# Patient Record
Sex: FEMALE | Race: BLACK OR AFRICAN AMERICAN | Employment: UNEMPLOYED | URBAN - METROPOLITAN AREA
[De-identification: names, ages, dates, MRNs, and addresses within clinical notes are randomized per-mention and may not be internally consistent; named-entity substitution may affect disease eponyms.]

---

## 2017-01-01 ENCOUNTER — ED HISTORICAL/CONVERTED ENCOUNTER (OUTPATIENT)
Dept: OTHER | Age: 57
End: 2017-01-01

## 2017-01-01 ENCOUNTER — OP HISTORICAL/CONVERTED ENCOUNTER (OUTPATIENT)
Dept: OTHER | Age: 57
End: 2017-01-01

## 2017-01-01 ENCOUNTER — OFFICE VISIT (OUTPATIENT)
Dept: ENDOCRINOLOGY | Age: 57
End: 2017-01-01

## 2017-01-01 VITALS
HEART RATE: 74 BPM | WEIGHT: 152.3 LBS | RESPIRATION RATE: 16 BRPM | SYSTOLIC BLOOD PRESSURE: 146 MMHG | HEIGHT: 67 IN | DIASTOLIC BLOOD PRESSURE: 90 MMHG | OXYGEN SATURATION: 100 % | TEMPERATURE: 97.1 F | BODY MASS INDEX: 23.9 KG/M2

## 2017-01-01 DIAGNOSIS — G62.9 NEUROPATHY: ICD-10-CM

## 2017-01-01 DIAGNOSIS — K74.60 CIRRHOSIS OF LIVER WITHOUT ASCITES, UNSPECIFIED HEPATIC CIRRHOSIS TYPE (HCC): ICD-10-CM

## 2017-01-01 DIAGNOSIS — Z79.4 TYPE 2 DIABETES MELLITUS WITH HYPERGLYCEMIA, WITH LONG-TERM CURRENT USE OF INSULIN (HCC): ICD-10-CM

## 2017-01-01 DIAGNOSIS — E11.65 TYPE 2 DIABETES MELLITUS WITH HYPERGLYCEMIA, WITH LONG-TERM CURRENT USE OF INSULIN (HCC): Primary | ICD-10-CM

## 2017-01-01 DIAGNOSIS — Z79.4 TYPE 2 DIABETES MELLITUS WITH HYPERGLYCEMIA, WITH LONG-TERM CURRENT USE OF INSULIN (HCC): Primary | ICD-10-CM

## 2017-01-01 DIAGNOSIS — E11.65 TYPE 2 DIABETES MELLITUS WITH HYPERGLYCEMIA, WITH LONG-TERM CURRENT USE OF INSULIN (HCC): ICD-10-CM

## 2017-01-01 LAB
GLUCOSE POC: 208 MG/DL
HBA1C MFR BLD HPLC: 5.5 %

## 2017-01-01 RX ORDER — NATEGLINIDE 60 MG/1
60 TABLET ORAL
Qty: 90 TAB | Refills: 6 | Status: SHIPPED | OUTPATIENT
Start: 2017-01-01 | End: 2018-01-01 | Stop reason: ALTCHOICE

## 2017-01-01 RX ORDER — LANCING DEVICE
EACH MISCELLANEOUS
Qty: 1 EACH | Refills: 0 | Status: SHIPPED | OUTPATIENT
Start: 2017-01-01

## 2017-01-27 ENCOUNTER — OP HISTORICAL/CONVERTED ENCOUNTER (OUTPATIENT)
Dept: OTHER | Age: 57
End: 2017-01-27

## 2017-03-24 ENCOUNTER — IP HISTORICAL/CONVERTED ENCOUNTER (OUTPATIENT)
Dept: OTHER | Age: 57
End: 2017-03-24

## 2017-06-26 ENCOUNTER — OFFICE VISIT (OUTPATIENT)
Dept: ENDOCRINOLOGY | Age: 57
End: 2017-06-26

## 2017-06-26 VITALS
TEMPERATURE: 98.1 F | HEART RATE: 87 BPM | DIASTOLIC BLOOD PRESSURE: 70 MMHG | WEIGHT: 166 LBS | HEIGHT: 67 IN | SYSTOLIC BLOOD PRESSURE: 106 MMHG | OXYGEN SATURATION: 100 % | BODY MASS INDEX: 26.06 KG/M2 | RESPIRATION RATE: 20 BRPM

## 2017-06-26 DIAGNOSIS — E11.65 TYPE 2 DIABETES MELLITUS WITH HYPERGLYCEMIA, WITH LONG-TERM CURRENT USE OF INSULIN (HCC): Primary | ICD-10-CM

## 2017-06-26 DIAGNOSIS — Z79.4 TYPE 2 DIABETES MELLITUS WITH HYPERGLYCEMIA, WITH LONG-TERM CURRENT USE OF INSULIN (HCC): Primary | ICD-10-CM

## 2017-06-26 DIAGNOSIS — K74.60 CIRRHOSIS OF LIVER WITHOUT ASCITES, UNSPECIFIED HEPATIC CIRRHOSIS TYPE (HCC): ICD-10-CM

## 2017-06-26 DIAGNOSIS — G62.9 NEUROPATHY: ICD-10-CM

## 2017-06-26 LAB — HBA1C MFR BLD HPLC: 7.2 %

## 2017-06-26 RX ORDER — TIZANIDINE 4 MG/1
1 TABLET ORAL
Refills: 0 | COMMUNITY
Start: 2017-05-25

## 2017-06-26 RX ORDER — EMOLLIENT BASE
CREAM (GRAM) TOPICAL
COMMUNITY
Start: 2017-05-25

## 2017-06-26 RX ORDER — MORPHINE SULFATE 15 MG/1
TABLET ORAL
Refills: 0 | COMMUNITY
Start: 2017-05-27

## 2017-06-26 RX ORDER — ALPRAZOLAM 0.25 MG/1
TABLET ORAL
COMMUNITY
Start: 2017-05-25

## 2017-06-26 RX ORDER — LOSARTAN POTASSIUM 25 MG/1
TABLET ORAL
COMMUNITY
Start: 2017-03-31

## 2017-06-26 RX ORDER — IPRATROPIUM BROMIDE AND ALBUTEROL SULFATE 2.5; .5 MG/3ML; MG/3ML
SOLUTION RESPIRATORY (INHALATION)
Refills: 0 | COMMUNITY
Start: 2017-03-31

## 2017-06-26 RX ORDER — INSULIN GLARGINE 100 [IU]/ML
INJECTION, SOLUTION SUBCUTANEOUS
Qty: 30 ML | Refills: 4 | Status: SHIPPED | OUTPATIENT
Start: 2017-06-26 | End: 2018-01-01 | Stop reason: SDUPTHER

## 2017-06-26 RX ORDER — NATEGLINIDE 60 MG/1
60 TABLET ORAL
Qty: 90 TAB | Refills: 6 | Status: SHIPPED | OUTPATIENT
Start: 2017-06-26 | End: 2017-01-01 | Stop reason: SDUPTHER

## 2017-06-26 NOTE — PROGRESS NOTES
Wt Readings from Last 3 Encounters:   06/26/17 166 lb (75.3 kg)   11/10/16 174 lb 12.8 oz (79.3 kg)   08/03/16 177 lb (80.3 kg)     Temp Readings from Last 3 Encounters:   06/26/17 98.1 °F (36.7 °C) (Oral)   11/10/16 97.6 °F (36.4 °C) (Oral)   08/03/16 97 °F (36.1 °C) (Oral)     BP Readings from Last 3 Encounters:   06/26/17 106/70   11/10/16 (!) 134/95   08/03/16 139/77     Pulse Readings from Last 3 Encounters:   06/26/17 87   11/10/16 (!) 200   08/03/16 87     Lab Results   Component Value Date/Time    Hemoglobin A1c 9.3 06/30/2016 12:11 PM    Hemoglobin A1c, External 6.5 11/09/2016     No Podiatry  Last Eye exam 2017

## 2017-06-26 NOTE — PROGRESS NOTES
Order placed for pt,  per Verbal Order with read back from Dr Barb Ross on 6/26/2017. For information on Fall & Injury Prevention, visit [email or web address]

## 2017-06-26 NOTE — PROGRESS NOTES
HISTORY OF PRESENT ILLNESS  Magdaleno Matthews is a 65 y.o. female. HPI  Patient here for third   f/u after  initial visit of Type 2 diabetes mellitus  From Nov 2016 . Lost 8  lbs     She has done good with sugars and followed all instructions     Sugars look great   Tolerating Lantus  Well along with starlix           Old history :    Referred : by pcp     H/o diabetes for 1 month     She is accompanied by her daughter     She got diagnsed a few months ago , but not gotten treatment   [facility] - when her random blood sugars were over 400 mg     She got onto Metformin at [facility] 2 weeks ago June 16 2016     Current A1C is 7 %    From may 2016  and symptoms/problems include none, polyuria, polydipsia, visual disturbances and fatigue     Current diabetic medications include diet. Current monitoring regimen: home blood tests - daily  Home blood sugar records: trend: fluctuating a lot  Any episodes of hypoglycemia? no    Weight trend: decreasing rapidly  Prior visit with dietician: no  Current diet: \"unhealthy\" diet in general  Current exercise: no regular exercise    Known diabetic complications:   Cardiovascular risk factors: none    Eye exam current (within one year): no  RUDDY: no     Past Medical History:   Diagnosis Date    DM [facility]diabetes mellitus) ([facility] [address]     HTN (hypertension)     Liver cirrhosis (HCC)      Past Surgical History:   Procedure Laterality Date    HX HIP REPLACEMENT Left     HX WRIST FRACTURE TX       Current Outpatient Prescriptions   Medication Sig    morphine IR (MS IR) 15 mg tablet take 1 tablet by mouth once daily as directed for 30 DAYS    tiZANidine (ZANAFLEX) 4 mg tablet Take 1 Tab by mouth nightly.     ALPRAZolam (XANAX) 0.25 mg tablet     VANICREAM topical cream     losartan (COZAAR) 25 mg tablet     albuterol-ipratropium (DUO-NEB) 2.5 mg-0.5 mg/3 ml nebu inhale contents of 1 vial in nebulizer every 6 hours while awake    PROCTOZONE-HC 2.5 % rectal cream  BD ULTRA-FINE II LANCETS 30 gauge misc     ibuprofen (MOTRIN) 600 mg tablet     LYRICA 50 mg capsule Take 1 Cap by mouth three (3) times daily.  metFORMIN (GLUCOPHAGE) 500 mg tablet Take  by mouth two (2) times daily (with meals).  Cetirizine 10 mg cap Take  by mouth.  folic acid (FOLVITE) 1 mg tablet Take  by mouth daily.  furosemide (LASIX) 40 mg tablet Take 40 mg by mouth daily.  omeprazole (PRILOSEC) 40 mg capsule Take 40 mg by mouth daily.  spironolactone (ALDACTONE) 50 mg tablet Take 100 mg by mouth two (2) times a day.  meclizine (ANTIVERT) 25 mg tablet Take  by mouth three (3) times daily as needed.  insulin glargine (LANTUS SOLOSTAR) 100 unit/mL (3 mL) pen Inject 24 units at bedtime    Insulin Needles, Disposable, (BD INSULIN PEN NEEDLE UF SHORT) 31 gauge x 5/16\" ndle Use once daily. Dx code E11.65    glucose blood VI test strips (FREESTYLE LITE STRIPS) strip Test 4 times daily. Dx code E11.65     No current facility-administered medications for this visit. Review of Systems   Constitutional: Negative. HENT: Negative. Eyes: Negative for pain and redness. Respiratory: Negative. Cardiovascular: Negative for chest pain, palpitations and leg swelling. Gastrointestinal: Negative. Negative for constipation. Genitourinary: Negative. Musculoskeletal: Negative for myalgias. Skin: Negative. Neurological: tingling on big toes. Endo/Heme/Allergies: Negative. Psychiatric/Behavioral: Negative for depression and memory loss. The patient does not have insomnia. Physical Exam   Constitutional: She is oriented to person, place, and time. She appears well-developed and well-nourished. HENT:   Head: Normocephalic. Eyes: Conjunctivae and EOM are normal. Pupils are equal, round, and reactive to light. Neck: Normal range of motion. Neck supple. No JVD present. No tracheal deviation present. No thyromegaly present.    Cardiovascular: Tachycardic  No murmur heard. Pulmonary/Chest: Breath sounds normal.   Abdominal: Soft. Bowel sounds are normal.   Musculoskeletal: Normal range of motion. Lymphadenopathy:     She has no cervical adenopathy. Neurological: She is alert and oriented to person, place, and time. She has normal reflexes. Skin: Skin is warm. Psychiatric: She has a normal mood and affect. Bilateral bunions on feet       NO NEW LABS   Last labs were done in Nov 2016       ASSESSMENT and PLAN  1. Type 2 DM uncontrolled :   a1c is   7.2 %     From   June 2017     compared to  6.5 %      From   Nov 3 2016  Compared to   9.3 %    From June 30 2016 compared to MAy 2016 - a1c is 7 %     She has done good , reassured her   Reviewed the glucose log : fasting  and Pre-prandial - at 100 mg    Tolerating it very well on  insulin , basal   And starlix ( skips starlix at lunches )    Discussed the insulin regimen, insulin actions and precautions   As she has liver compromise, will have to start low doses to prevent the hypoglycemias       Patient is advised to check blood sugars 3-4  times daily by rotation method. reviewed medications and side effects in detail  lab results and schedule of future lab studies reviewed with patient        2. Hypoglycemia :  Educated on treating the hypoglycemia. 3. HTN : only on spironolactone . She has cirrhosis , unsure of her child purgh classification    4. Dyslipidemia : not on meds. Has cirrhosis     5. use of aspirin to prevent MI and TIA's discussed      6. H/o cirrhosis,  Alcoholic and she had stopped it but occasionally she consumes still   She is on spironolactone and need info from her GI Dr. Zeb Plunkett      7.  Neuropathy : on lyrica tid         > 50 % of time is spent on counseling

## 2017-06-26 NOTE — PATIENT INSTRUCTIONS
Do not skip meals  Do not eat in between meals    Reduce carbs- pasta, rice, potatoes, bread   Do not drink juices or sodas  Donot eat peanut butter     Do not eat sugar free cookies and cakes   Do not eat peaches, grapes , pineapples and oranges       Lyrica 3 times a day         Check blood sugars immediately before each meal and at bedtime      Take  Lantus  insulin  24 units  at bed time    Stay on starlix 60 mg right before meals ( she says she never got this)   Less than 70 mg NO INSULIN

## 2017-06-26 NOTE — MR AVS SNAPSHOT
Visit Information     Date & Time Provider Department Dept. Phone Encounter #    6/26/2017  3:30 PM Geneva Bangura MD [facility] [place] [facility] [phone] [id number]      Follow-up Instructions     Return in about 6 months (around 12/26/2017). Upcoming Health Maintenance        Date Due    Hepatitis C Screening 1960    FOOT EXAM Q1 1/15/1971    EYE EXAM RETINAL OR DILATED Q1 1/15/1971    Pneumococcal 19-64 Medium Risk (1 of 1 - PPSV23) 1/15/1980    DTaP/Tdap/Td series (1 - Tdap) 1/15/1982    BREAST CANCER SCRN MAMMOGRAM 1/15/2011    FOBT Q 1 YEAR AGE 50-75 1/15/2011    HEMOGLOBIN A1C Q6M 5/9/2017    INFLUENZA AGE 9 TO ADULT 8/1/2017    MICROALBUMIN Q1 11/9/2017    LIPID PANEL Q1 11/9/2017    PAP AKA CERVICAL CYTOLOGY 4/27/2019      Allergies as of 6/26/2017  Review Complete On: 6/26/2017 By: Geneva Bangura MD       Severity Noted Reaction Type Reactions    Hydrocodone  08/03/2016    Itching, Other (comments)    Sweating. Current Immunizations  Never Reviewed    No immunizations on file. Not reviewed this visit   You Were Diagnosed With        Codes Comments    Type 2 diabetes mellitus with hyperglycemia, with long-term current use of insulin [facility]    -  Primary ICD-10-CM: E11.65, Z79.4  ICD-9-CM: 250.00, 790.29, V58.67     Cirrhosis of liver without ascites, unspecified hepatic cirrhosis type ([facility] [address]     ICD-10-CM: K74.60  ICD-9-CM: 571.5     Neuropathy     ICD-10-CM: G62.9  ICD-9-CM: 355. 9       Vitals     BP Pulse Temp Resp Height(growth percentile) Weight(growth percentile)    106/70 87 98.1 °F (36.7 °C) (Oral) 20 5' 7\" (1.702 m) 166 lb (75.3 kg)    SpO2 BMI OB Status Smoking Status          100% 26 kg/m2 Postmenopausal Current Every Day Smoker        BMI and BSA Data     Body Mass Index Body Surface Area    26 kg/m 2 1.89 m 2         Preferred Pharmacy       Pharmacy Name Phone    [facility] [address] [phone]         Your Updated Medication List This list is accurate as of: 6/26/17  4:25 PM.  Always use your most recent med list.                albuterol-ipratropium 2.5 mg-0.5 mg/3 ml Nebu   Commonly known as:  DUO-NEB   inhale contents of 1 vial in nebulizer every 6 hours while awake       ALPRAZolam 0.25 mg tablet   Commonly known as:  XANAX       BD ULTRA-FINE II LANCETS 30 gauge Misc   Generic drug:  lancets       Cetirizine 10 mg Cap   Take  by mouth. folic acid 1 mg tablet   Commonly known as:  FOLVITE   Take  by mouth daily. furosemide 40 mg tablet   Commonly known as:  LASIX   Take 40 mg by mouth daily. glucose blood VI test strips strip   Commonly known as:  FREESTYLE LITE STRIPS   Test 4 times daily. Dx code E11.65       ibuprofen 600 mg tablet   Commonly known as:  MOTRIN       insulin glargine 100 unit/mL (3 mL) Inpn   Commonly known as:  LOGANBASFRIDA   Inject 24 units at bedtime       Insulin Needles (Disposable) 31 gauge x 5/16\" Ndle   Commonly known as:  BD INSULIN PEN NEEDLE UF SHORT   Use once daily. Dx code E11.65       losartan 25 mg tablet   Commonly known as:  COZAAR       LYRICA 50 mg capsule   Generic drug:  pregabalin   Take 1 Cap by mouth three (3) times daily. meclizine 25 mg tablet   Commonly known as:  ANTIVERT   Take  by mouth three (3) times daily as needed. metFORMIN 500 mg tablet   Commonly known as:  GLUCOPHAGE   Take  by mouth two (2) times daily (with meals). morphine IR 15 mg tablet   Commonly known as:  MS IR   take 1 tablet by mouth once daily as directed for 30 DAYS       omeprazole 40 mg capsule   Commonly known as:  PRILOSEC   Take 40 mg by mouth daily. PROCTOZONE-HC 2.5 % rectal cream   Generic drug:  hydrocortisone       spironolactone 50 mg tablet   Commonly known as:  ALDACTONE   Take 100 mg by mouth two (2) times a day. tiZANidine 4 mg tablet   Commonly known as:  ZANAFLEX   Take 1 Tab by mouth nightly.        VANICREAM topical cream   Generic drug:  emollient Prescriptions Sent to Pharmacy        Refills    insulin glargine (LANTUS,BASAGLAR) 100 unit/mL (3 mL) inpn 4    Sig: Inject 24 units at bedtime    Class: Normal    Pharmacy: [facility] [address]  #: [phone]      We Performed the Following     AMB POC HEMOGLOBIN A1C [47726 CPT(R)]       Follow-up Instructions     Return in about 6 months (around 12/26/2017). Patient Instructions      Do not skip meals  Do not eat in between meals    Reduce carbs- pasta, rice, potatoes, bread   Do not drink juices or sodas  Donot eat peanut butter     Do not eat sugar free cookies and cakes   Do not eat peaches, grapes , pineapples and oranges       Lyrica 3 times a day         Check blood sugars immediately before each meal and at bedtime      Take  Lantus  insulin  24 units  at bed time    Stay on starlix 60 mg right before meals      Less than 70 mg NO INSULIN             Introducing [id number]! Tanis Epley introduces [employer] patient portal. Now you can access parts of your medical record, email your doctor's office, and request medication refills online. 1. In your internet browser, go to [email or web address]   2. Click on the First Time User? Click Here link in the Sign In box. You will see the New Member Sign Up page. 3. Enter your [employer] Access Code exactly as it appears below. You will not need to use this code after youve completed the sign-up process. If you do not sign up before the expiration date, you must request a new code. · [employer] Access Code: [email or web address]  Expires: 9/24/2017  4:25 PM    4. Enter the last four digits of your Social Security Number (xxxx) and Date of Birth (mm/dd/yyyy) as indicated and click Submit. You will be taken to the next sign-up page. 5. Create a TopLogt ID. This will be your [employer] login ID and cannot be changed, so think of one that is secure and easy to remember. 6. Create a N[employer]ix password.  You can change your password at any time. 7. Enter your Password Reset Question and Answer. This can be used at a later time if you forget your password. 8. Enter your e-mail address. You will receive e-mail notification when new information is available in [address] 9. Click Sign Up. You can now view and download portions of your medical record. 10. Click the Download Summary menu link to download a portable copy of your medical information. If you have questions, please visit the Frequently Asked Questions section of the [employer] website. Remember, [employer] is NOT to be used for urgent needs. For medical emergencies, dial 911. Now available from your iPhone and Android! Please provide this summary of care documentation to your next provider. Your primary care clinician is listed as Ethan Smith. If you have any questions after today's visit, please call [phone].

## 2017-12-15 NOTE — PROGRESS NOTES
HISTORY OF PRESENT ILLNESS  Junaid Vaughn is a 65 y.o. female. HPI  Patient here for   f/u after last visit of Type 2 diabetes mellitus  From June 2017  . Lost 14  lbs     She has done good with diet   She is happy that she is avoiding having to take med with each  Meal      Sugars look great   Tolerating Lantus            Old history :    Referred : by pcp     H/o diabetes for 1 month     She is accompanied by her daughter     She got diagnsed a few months ago , but not gotten treatment   She was [facility] to [facility] - when her random blood sugars were over 400 mg     She got onto Metformin at [facility] 2 weeks ago June 16 2016     Current A1C is 7 %    From may 2016  and symptoms/problems include none, polyuria, polydipsia, visual disturbances and fatigue     Current diabetic medications include diet. Current monitoring regimen: home blood tests - daily  Home blood sugar records: trend: fluctuating a lot  Any episodes of hypoglycemia? no    Weight trend: decreasing rapidly  Prior visit with dietician: no  Current diet: \"unhealthy\" diet in general  Current exercise: no regular exercise    Known diabetic complications:   Cardiovascular risk factors: none    Eye exam current (within one year): no  RUDDY: no     Past Medical History:   Diagnosis Date    DM [facility]di[facility] mellitus) ([facility] [address]     HTN (hypertension)     Liver cirrhosis (HCC)      Past Surgical History:   Procedure Laterality Date    HX HIP REPLACEMENT Left     HX WRIST FRACTURE TX       Current Outpatient Prescriptions   Medication Sig    tiZANidine (ZANAFLEX) 4 mg tablet Take 1 Tab by mouth nightly.     ALPRAZolam (XANAX) 0.25 mg tablet     VANICREAM topical cream     albuterol-ipratropium (DUO-NEB) 2.5 mg-0.5 mg/3 ml nebu inhale contents of 1 vial in nebulizer every 6 hours while awake    insulin glargine (LANTUS,BASAGLAR) 100 unit/mL (3 mL) inpn Inject 24 units at bedtime    PROCTOZONE-HC 2.5 % rectal cream     BD ULTRA-FINE II LANCETS 30 gauge misc     ibuprofen (MOTRIN) 600 mg tablet 800 mg.  LYRICA 50 mg capsule Take 1 Cap by mouth three (3) times daily.  metFORMIN (GLUCOPHAGE) 500 mg tablet Take  by mouth two (2) times daily (with meals).  Cetirizine 10 mg cap Take  by mouth.  folic acid (FOLVITE) 1 mg tablet Take  by mouth daily.  furosemide (LASIX) 40 mg tablet Take 40 mg by mouth daily.  omeprazole (PRILOSEC) 40 mg capsule Take 40 mg by mouth daily.  spironolactone (ALDACTONE) 50 mg tablet Take 100 mg by mouth two (2) times a day.  Insulin Needles, Disposable, (BD INSULIN PEN NEEDLE UF SHORT) 31 gauge x 5/16\" ndle Use once daily. Dx code E11.65    glucose blood VI test strips (FREESTYLE LITE STRIPS) strip Test 4 times daily. Dx code E11.65    morphine IR (MS IR) 15 mg tablet take 1 tablet by mouth once daily as directed for 30 DAYS    losartan (COZAAR) 25 mg tablet     nateglinide (STARLIX) 60 mg tablet Take 1 Tab by mouth Before breakfast, lunch, and dinner.  meclizine (ANTIVERT) 25 mg tablet Take  by mouth three (3) times daily as needed. No current facility-administered medications for this visit. Review of Systems   Constitutional: lost weight . HENT: Negative. Eyes: Negative for pain and redness. Respiratory: Negative. Cardiovascular: Negative for chest pain, palpitations and leg swelling. Gastrointestinal: Negative. Negative for constipation. Genitourinary: Negative. Musculoskeletal: Negative for myalgias. Skin: Negative. Neurological: tingling on big toes. Endo/Heme/Allergies: Negative. Psychiatric/Behavioral: Negative for depression and memory loss. The patient does not have insomnia. Physical Exam   Constitutional: She is oriented to person, place, and time. She appears well-developed and well-nourished. HENT:   Head: Normocephalic. Eyes: Conjunctivae and EOM are normal. Pupils are equal, round, and reactive to light. Neck: Normal range of motion. Neck supple. No JVD present. No tracheal deviation present. No thyromegaly present. Cardiovascular: Tachycardic  No murmur heard. Pulmonary/Chest: Breath sounds normal.   Abdominal: Soft. Bowel sounds are normal.   Musculoskeletal: Normal range of motion. Lymphadenopathy:     She has no cervical adenopathy. Neurological: She is alert and oriented to person, place, and time. She has normal reflexes. Skin: Skin is warm. Psychiatric: She has a normal mood and affect. Bilateral bunions on feet         Labs from June 2017 scanned to [facility] and PLAN  1. Type 2 DM uncontrolled :   a1c is  5.5 %     From    Dec 2017   Compared to   7.2 %     From   June 2017     compared to  6.5 %      From   Nov 3 2016  Compared to   9.3 %    From June 30 2016 compared to MAy 2016 - a1c is 7 %     She has done good , she Dislikes low sugars and she is working to EAT right   She has few sugars - which are high   Reviewed the glucose log : fasting  and Pre-prandial - at 100 mg    Tolerating it very well on  insulin , basal   And starlix ( skips starlix at lunches )    Discussed the insulin regimen, insulin actions and precautions   As she has liver compromise, will have to start low doses to prevent the hypoglycemias       Patient is advised to check blood sugars 3-4  times daily by rotation method. reviewed medications and side effects in detail  lab results and schedule of future lab studies reviewed with patient        2. Hypoglycemia :  Educated on treating the hypoglycemia. 3. HTN : only on spironolactone . She has cirrhosis , unsure of her child purgh classification    4. Dyslipidemia : not on meds. Has cirrhosis     5. use of aspirin to prevent MI and TIA's discussed      6. H/o cirrhosis,  Alcoholic and she had stopped it but occasionally she consumes still   She is on spironolactone and need info from her GI Dr. Marah Andrade      7.  Neuropathy : on lyrica tid         > 50 % of time is spent on counseling   Patient voiced understanding her plan of care

## 2017-12-15 NOTE — PROGRESS NOTES
Chief Complaint   Patient presents with    Diabetes     1. Have you been to the ER, urgent care clinic since your last visit? Hospitalized since your last visit? No    2. Have you seen or consulted any other health care providers outside of the [address] [place] [address] since your last visit? Include any pap smears or colon screening.  No     Wt Readings from Last 3 Encounters:   12/15/17 152 lb 4.8 oz (69.1 kg)   06/26/17 166 lb (75.3 kg)   11/10/16 174 lb 12.8 oz (79.3 kg)     Temp Readings from Last 3 Encounters:   12/15/17 97.1 °F (36.2 °C) (Oral)   06/26/17 98.1 °F (36.7 °C) (Oral)   11/10/16 97.6 °F (36.4 °C) (Oral)     BP Readings from Last 3 Encounters:   12/15/17 146/90   06/26/17 106/70   11/10/16 (!) 134/95     Pulse Readings from Last 3 Encounters:   12/15/17 74   06/26/17 87   11/10/16 (!) 200     Pt has meter  Pt had a foot and eye exam

## 2017-12-15 NOTE — MR AVS SNAPSHOT
Visit Information     Date & Time Provider Department Dept. Phone Encounter #    12/15/2017 11:45 AM Sarath Hough MD [facility] Diabetes & Endocrinology [phone] [id number]      Follow-up Instructions     Return in about 6 months (around 6/15/2018). Upcoming Health Maintenance        Date Due    Hepatitis C Screening 1960    EYE EXAM RETINAL OR DILATED Q1 1/15/1971    Pneumococcal 19-64 Medium Risk (1 of 1 - PPSV23) 1/15/1980    DTaP/Tdap/Td series (1 - Tdap) 1/15/1982    FOBT Q 1 YEAR AGE 50-75 1/15/2011    Influenza Age 5 to Adult 8/1/2017    HEMOGLOBIN A1C Q6M 1/25/2018    MICROALBUMIN Q1 7/25/2018    LIPID PANEL Q1 7/25/2018    FOOT EXAM Q1 10/19/2018    PAP AKA CERVICAL CYTOLOGY 4/27/2019      Allergies as of 12/15/2017  Review Complete On: 12/15/2017 By: Sarath Hough MD       Severity Noted Reaction Type Reactions    Hydrocodone  08/03/2016    Itching, Other (comments)    Sweating. Current Immunizations  Never Reviewed    No immunizations on file.        Not reviewed this visit   You Were Diagnosed With        Codes Comments    Type 2 diabetes mellitus with hyperglycemia, with long-term current use of insulin ([facility]    -  Primary ICD-10-CM: E11.65, Z79.4  ICD-9-CM: 250.00, 790.29, V58.67       Vitals     BP Pulse Temp Resp Height(growth percentile) Weight(growth percentile)    146/90 (BP 1 Location: Right arm, BP Patient Position: Sitting) 74 97.1 °F (36.2 °C) (Oral) 16 5' 7\" (1.702 m) 152 lb 4.8 oz (69.1 kg)    SpO2 BMI OB Status Smoking Status          100% 23.85 kg/m2 Postmenopausal Current Every Day Smoker        BMI and BSA Data     Body Mass Index Body Surface Area    23.85 kg/m 2 1.81 m 2         Preferred Pharmacy       Pharmacy Name Phone    [facility] [address] [phone]         Your Updated Medication List          This list is accurate as of: 12/15/17 12:28 PM.  Always use your most recent med list. albuterol-ipratropium 2.5 mg-0.5 mg/3 ml Nebu   Commonly known as:  DUO-NEB   inhale contents of 1 vial in nebulizer every 6 hours while awake       ALPRAZolam 0.25 mg tablet   Commonly known as:  XANAX       BD ULTRA-FINE II LANCETS 30 gauge Misc   Generic drug:  lancets       Cetirizine 10 mg Cap   Take  by mouth. folic acid 1 mg tablet   Commonly known as:  FOLVITE   Take  by mouth daily. furosemide 40 mg tablet   Commonly known as:  LASIX   Take 40 mg by mouth daily. glucose blood VI test strips strip   Commonly known as:  FREESTYLE LITE STRIPS   Test 4 times daily. Dx code E11.65       ibuprofen 600 mg tablet   Commonly known as:  MOTRIN   800 mg.       insulin glargine 100 unit/mL (3 mL) Inpn   Commonly known as:  SILVIANO FORD   Inject 24 units at bedtime       Insulin Needles (Disposable) 31 gauge x 5/16\" Ndle   Commonly known as:  BD INSULIN PEN NEEDLE UF SHORT   Use once daily. Dx code E11.65       losartan 25 mg tablet   Commonly known as:  COZAAR       LYRICA 50 mg capsule   Generic drug:  pregabalin   Take 1 Cap by mouth three (3) times daily. meclizine 25 mg tablet   Commonly known as:  ANTIVERT   Take  by mouth three (3) times daily as needed. metFORMIN 500 mg tablet   Commonly known as:  GLUCOPHAGE   Take  by mouth two (2) times daily (with meals). morphine IR 15 mg tablet   Commonly known as:  MS IR   take 1 tablet by mouth once daily as directed for 30 DAYS       nateglinide 60 mg tablet   Commonly known as:  STARLIX   Take 1 Tab by mouth Before breakfast, lunch, and dinner. omeprazole 40 mg capsule   Commonly known as:  PRILOSEC   Take 40 mg by mouth daily. PROCTOZONE-HC 2.5 % rectal cream   Generic drug:  hydrocortisone       spironolactone 50 mg tablet   Commonly known as:  ALDACTONE   Take 100 mg by mouth two (2) times a day. tiZANidine 4 mg tablet   Commonly known as:  ZANAFLEX   Take 1 Tab by mouth nightly.        VANICREAM topical cream Generic drug:  emollient               We Performed the Following     AMB POC GLUCOSE BLOOD, BY GLUCOSE MONITORING DEVICE [90497 CPT(R)]     AMB POC HEMOGLOBIN A1C [49109 CPT(R)]       Follow-up Instructions     Return in about 6 months (around 6/15/2018). Patient Instructions      Do not skip meals  Do not eat in between meals    Reduce carbs- pasta, rice, potatoes, bread   Do not drink juices or sodas  Donot eat peanut butter     Do not eat sugar free cookies and cakes   Do not eat peaches, grapes , pineapples and oranges       Lyrica 3 times a day     Stay on metformin 500 mg twice a day     Check blood sugars immediately before each meal and at bedtime      Take  Lantus  insulin  24 units  at bed time    Stay on starlix 60 mg right before meals  ( has not taken yet !! )    Less than 70 mg NO INSULIN             Introducing [facility]! Sotero Sweeney introduces [employer] patient portal. Now you can access parts of your medical record, email your doctor's office, and request medication refills online. 1. In your internet browser, go to [email or web address]   2. Click on the First Time User? Click Here link in the Sign In box. You will see the New Member Sign Up page. 3. Enter your [employer] Access Code exactly as it appears below. You will not need to use this code after youve completed the sign-up process. If you do not sign up before the expiration date, you must request a new code. · [employer] Access Code: [email or web address]  Expires: 3/15/2018 12:28 PM    4. Enter the last four digits of your Social Security Number (xxxx) and Date of Birth (mm/dd/yyyy) as indicated and click Submit. You will be taken to the next sign-up page. 5. Create a [email or web address].comt ID. This will be your [employer] login ID and cannot be changed, so think of one that is secure and easy to remember. 6. Create a [employer] password. You can change your password at any time.   7. Enter your Password Reset Question and Answer. This can be used at a later time if you forget your password. 8. Enter your e-mail address. You will receive e-mail notification when new information is available in [address] 9. Click Sign Up. You can now view and download portions of your medical record. 10. Click the Download Summary menu link to download a portable copy of your medical information. If you have questions, please visit the Frequently Asked Questions section of the [employer] website. Remember, [employer] is NOT to be used for urgent needs. For medical emergencies, dial 911. Now available from your iPhone and Android! Please provide this summary of care documentation to your next provider. Your primary care clinician is listed as Kellie Obando. If you have any questions after today's visit, please call [phone].

## 2017-12-15 NOTE — PATIENT INSTRUCTIONS
Do not skip meals  Do not eat in between meals    Reduce carbs- pasta, rice, potatoes, bread   Do not drink juices or sodas  Donot eat peanut butter     Do not eat sugar free cookies and cakes   Do not eat peaches, grapes , pineapples and oranges       Lyrica 3 times a day     Stay on metformin 500 mg twice a day     Check blood sugars immediately before each meal and at bedtime      Take  Lantus  insulin  24 units  at bed time    Stay on starlix 60 mg right before meals  ( has not taken yet !! )    Less than 70 mg NO INSULIN

## 2018-01-01 ENCOUNTER — OFFICE VISIT (OUTPATIENT)
Dept: ENDOCRINOLOGY | Age: 58
End: 2018-01-01

## 2018-01-01 ENCOUNTER — IP HISTORICAL/CONVERTED ENCOUNTER (OUTPATIENT)
Dept: OTHER | Age: 58
End: 2018-01-01

## 2018-01-01 ENCOUNTER — ED HISTORICAL/CONVERTED ENCOUNTER (OUTPATIENT)
Dept: OTHER | Age: 58
End: 2018-01-01

## 2018-01-01 VITALS
RESPIRATION RATE: 18 BRPM | TEMPERATURE: 96.6 F | SYSTOLIC BLOOD PRESSURE: 122 MMHG | WEIGHT: 142.3 LBS | DIASTOLIC BLOOD PRESSURE: 79 MMHG | OXYGEN SATURATION: 100 % | HEART RATE: 90 BPM | HEIGHT: 67 IN | BODY MASS INDEX: 22.34 KG/M2

## 2018-01-01 DIAGNOSIS — K74.60 CIRRHOSIS OF LIVER WITHOUT ASCITES, UNSPECIFIED HEPATIC CIRRHOSIS TYPE (HCC): ICD-10-CM

## 2018-01-01 DIAGNOSIS — Z79.4 TYPE 2 DIABETES MELLITUS WITH HYPERGLYCEMIA, WITH LONG-TERM CURRENT USE OF INSULIN (HCC): ICD-10-CM

## 2018-01-01 DIAGNOSIS — E11.65 TYPE 2 DIABETES MELLITUS WITH HYPERGLYCEMIA, WITH LONG-TERM CURRENT USE OF INSULIN (HCC): ICD-10-CM

## 2018-01-01 DIAGNOSIS — G62.9 NEUROPATHY: ICD-10-CM

## 2018-01-01 DIAGNOSIS — Z79.4 TYPE 2 DIABETES MELLITUS WITH HYPERGLYCEMIA, WITH LONG-TERM CURRENT USE OF INSULIN (HCC): Primary | ICD-10-CM

## 2018-01-01 DIAGNOSIS — E11.65 TYPE 2 DIABETES MELLITUS WITH HYPERGLYCEMIA, WITH LONG-TERM CURRENT USE OF INSULIN (HCC): Primary | ICD-10-CM

## 2018-01-01 LAB
CREATININE, EXTERNAL: 0.9
HBA1C MFR BLD HPLC: 6.3 %
LDL-C, EXTERNAL: 130

## 2018-01-01 RX ORDER — METFORMIN HYDROCHLORIDE 500 MG/1
TABLET ORAL
Qty: 180 TAB | Refills: 4 | Status: SHIPPED | OUTPATIENT
Start: 2018-01-01

## 2018-01-01 RX ORDER — FERROUS SULFATE TAB 325 MG (65 MG ELEMENTAL FE) 325 (65 FE) MG
TAB ORAL
COMMUNITY
Start: 2018-01-01

## 2018-01-01 RX ORDER — INSULIN GLARGINE 100 [IU]/ML
INJECTION, SOLUTION SUBCUTANEOUS
Qty: 30 ML | Refills: 4 | Status: SHIPPED | OUTPATIENT
Start: 2018-01-01

## 2018-06-12 NOTE — PROGRESS NOTES
Wt Readings from Last 3 Encounters:   06/12/18 142 lb 4.8 oz (64.5 kg)   12/15/17 152 lb 4.8 oz (69.1 kg)   06/26/17 166 lb (75.3 kg)     Temp Readings from Last 3 Encounters:   06/12/18 96.6 °F (35.9 °C) (Oral)   12/15/17 97.1 °F (36.2 °C) (Oral)   06/26/17 98.1 °F (36.7 °C) (Oral)     BP Readings from Last 3 Encounters:   06/12/18 122/79   12/15/17 146/90   06/26/17 106/70     Pulse Readings from Last 3 Encounters:   06/12/18 90   12/15/17 74   06/26/17 87     Lab Results   Component Value Date/Time    Hemoglobin A1c 9.3 (H) 06/30/2016 12:11 PM    Hemoglobin A1c (POC) 5.5 12/15/2017 12:14 PM    Hemoglobin A1c, External 6.5 11/09/2016     Patient does not have meter or logbook today.   Patient needs eye exam referral.

## 2018-06-12 NOTE — PROGRESS NOTES
HISTORY OF PRESENT ILLNESS  Prasnana Huynh is a 63 y.o. female. HPI  Patient here for   f/u after last visit of Type 2 diabetes mellitus  From Dec  2017  . Lost 10  lbs       In the inteirm, she had issues with constipation and took oxycodone     She has labs from April 2018 which is showing elevated LFTS and high LDL       She has done good with diet   She is happy that she is avoiding having to take med with each  Meal      Sugars look great   Tolerating Lantus            Old history :    Referred : by pcp     H/o diabetes for 1 month     She is accompanied by her daughter     She got diagnsed a few months ago , but not gotten treatment   [facility] - when her random blood sugars were over 400 mg     She got onto Metformin at [facility] 2 weeks ago June 16 2016     Current A1C is 7 %    From may 2016  and symptoms/problems include none, polyuria, polydipsia, visual disturbances and fatigue     Current diabetic medications include diet. Current monitoring regimen: home blood tests - daily  Home blood sugar records: trend: fluctuating a lot  Any episodes of hypoglycemia? no    Weight trend: decreasing rapidly  Prior visit with dietician: no  Current diet: \"unhealthy\" diet in general  Current exercise: no regular exercise    Known diabetic complications:   Cardiovascular risk factors: none    Eye exam current (within one year): no  RUDDY: no     Past Medical History:   Diagnosis Date    DM (diabetes mellitus) ([address]     HTN (hypertension)     Liver cirrhosis (HCC)      Past Surgical History:   Procedure Laterality Date    HX HIP REPLACEMENT Left     HX WRIST FRACTURE TX       Current Outpatient Prescriptions   Medication Sig    FEROSUL 325 mg (65 mg iron) tablet     Lancing Device misc To go with freestyle meter. Test 4 times daily.  Dx code E11.65    ALPRAZolam (XANAX) 0.25 mg tablet     VANICREAM topical cream     losartan (COZAAR) 25 mg tablet     albuterol-ipratropium (DUO-NEB) 2.5 mg-0.5 mg/3 ml nebu inhale contents of 1 vial in nebulizer every 6 hours while awake    insulin glargine (LANTUS,BASAGLAR) 100 unit/mL (3 mL) inpn Inject 24 units at bedtime    PROCTOZONE-HC 2.5 % rectal cream     BD ULTRA-FINE II LANCETS 30 gauge misc     ibuprofen (MOTRIN) 600 mg tablet 800 mg.  LYRICA 50 mg capsule Take 1 Cap by mouth three (3) times daily.  metFORMIN (GLUCOPHAGE) 500 mg tablet Take  by mouth two (2) times daily (with meals).  Cetirizine 10 mg cap Take  by mouth.  folic acid (FOLVITE) 1 mg tablet Take  by mouth daily.  furosemide (LASIX) 40 mg tablet Take 40 mg by mouth daily.  omeprazole (PRILOSEC) 40 mg capsule Take 40 mg by mouth daily.  spironolactone (ALDACTONE) 50 mg tablet Take 100 mg by mouth two (2) times a day.  meclizine (ANTIVERT) 25 mg tablet Take  by mouth three (3) times daily as needed.  Insulin Needles, Disposable, (BD INSULIN PEN NEEDLE UF SHORT) 31 gauge x 5/16\" ndle Use once daily. Dx code E11.65    glucose blood VI test strips (FREESTYLE LITE STRIPS) strip Test 4 times daily. Dx code E11.65    nateglinide (STARLIX) 60 mg tablet Take 1 Tab by mouth Before breakfast, lunch, and dinner.  morphine IR (MS IR) 15 mg tablet take 1 tablet by mouth once daily as directed for 30 DAYS    tiZANidine (ZANAFLEX) 4 mg tablet Take 1 Tab by mouth nightly. No current facility-administered medications for this visit. Review of Systems   Constitutional: lost weight . HENT: Negative. Eyes: Negative for pain and redness. Respiratory: Negative. Cardiovascular: Negative for chest pain, palpitations and leg swelling. Gastrointestinal: Negative. Negative for constipation. Genitourinary: Negative. Musculoskeletal: Negative for myalgias. Skin: Negative. Neurological: tingling on big toes. Endo/Heme/Allergies: Negative. Psychiatric/Behavioral: Negative for depression and memory loss. The patient does not have insomnia. Physical Exam   Constitutional: She is oriented to person, place, and time. She appears well-developed and well-nourished. HENT:   Head: Normocephalic. Eyes: Conjunctivae and EOM are normal. Pupils are equal, round, and reactive to light. Neck: Normal range of motion. Neck supple. No JVD present. No tracheal deviation present. No thyromegaly present. Cardiovascular: Tachycardic  No murmur heard. Pulmonary/Chest: Breath sounds normal.   Abdominal: Soft. Bowel sounds are normal.   Musculoskeletal: Normal range of motion. Lymphadenopathy:     She has no cervical adenopathy. Neurological: She is alert and oriented to person, place, and time. She has normal reflexes. Skin: Skin is warm. Psychiatric: She has a normal mood and affect. Bilateral bunions on feet     Diabetic foot exam:   June 2018     Left Foot:   Visual Exam: normal    Pulse DP: 2+ (normal)   Filament test: REDUCED SENATION   VIBRATION REDUCED       Right Foot:   Visual Exam: normal    Pulse DP: 2+ (normal)   Filament test: reduced sensation    Vibratory sensation: diminished        Labs from April 2018         ASSESSMENT and PLAN  1.  Type 2 DM uncontrolled :   a1c is   6.3 %       From      June 2018      Compared to   5.5 %     From    Dec 2017   Compared to   7.2 %     From   June 2017     compared to  6.5 %      From   Nov 3 2016  Compared to   9.3 %    From June 30 2016 compared to MAy 2016 - a1c is 7 %     She has done good , she Dislikes low sugars and she is working to EAT right   She has lost 10 lbs  ( decreased appetite from cirrhosis )    No METER , she forgot     Tolerating it very well on  insulin , basal   She could not TOLERATE STARLIX AS SHE HAD HYPOGLYCEMIAS   SHE IS TOLD TO STOP     Discussed the insulin regimen, insulin actions and precautions   As she has liver compromise, will have to start low doses to prevent the hypoglycemias       Patient is advised to check blood sugars 3-4  times daily by rotation method. reviewed medications and side effects in detail  lab results and schedule of future lab studies reviewed with patient        2. Hypoglycemia :  Educated on treating the hypoglycemia. 3. HTN : only on spironolactone . She has cirrhosis , unsure of her child purgh classification    4. Dyslipidemia : not on meds. Has cirrhosis     5. use of aspirin to prevent MI and TIA's discussed      6. H/o cirrhosis,  Alcoholic and she had stopped it but occasionally she consumes still   She is on spironolactone and need info from her GI Dr. Bela Burnett      7.  Neuropathy : on lyrica tid         > 50 % of time is spent on counseling   Patient voiced understanding her plan of care

## 2018-06-12 NOTE — MR AVS SNAPSHOT
[address] [facility] [address]  [phone]     Patient: Juan A Raymond  MRN: [id number]  :1960               Visit Information     Date & Time Provider Department Dept. Phone Encounter #    2018 10:45 AM Aida Guevara MD [facility] Diabetes & Endocrinology [phone] [id number]      Follow-up Instructions     Return in about 6 months (around 2018). Upcoming Health Maintenance        Date Due    Hepatitis C Screening 1960    EYE EXAM RETINAL OR DILATED Q1 1/15/1971    Pneumococcal 19-64 Medium Risk (1 of 1 - PPSV23) 1/15/1980    DTaP/Tdap/Td series (1 - Tdap) 1/15/1982    BREAST CANCER SCRN MAMMOGRAM 1/15/2011    FOBT Q 1 YEAR AGE 50-75 1/15/2011    HEMOGLOBIN A1C Q6M 6/15/2018    MICROALBUMIN Q1 2018    LIPID PANEL Q1 2018    Influenza Age 9 to Adult 2018    FOOT EXAM Q1 10/19/2018    PAP AKA CERVICAL CYTOLOGY 2019      Allergies as of 2018  Review Complete On: 2018 By: Aida Guevara MD       Severity Noted Reaction Type Reactions    Hydrocodone  2016    Itching, Other (comments)    Sweating. Current Immunizations  Never Reviewed    No immunizations on file. Not reviewed this visit   You Were Diagnosed With        Codes Comments    Type 2 diabetes mellitus with hyperglycemia, with long-term current use of insulin ([facility]    -  Primary ICD-10-CM: E11.65, Z79.4  ICD-9-CM: 250.00, 790.29, V58.67     Cirrhosis of liver without ascites, unspecified hepatic cirrhosis type ([address]     ICD-10-CM: K74.60  ICD-9-CM: 571.5     Neuropathy     ICD-10-CM: G62.9  ICD-9-CM: 355. 9       Vitals     BP Pulse Temp Resp Height(growth percentile) Weight(growth percentile)    122/79 (BP 1 Location: Right arm, BP Patient Position: Sitting) 90 96.6 °F (35.9 °C) (Oral) 18 5' 7\" (1.702 m) 142 lb 4.8 oz (64.5 kg)    SpO2 BMI OB Status Smoking Status          100% 22.29 kg/m2 Postmenopausal Current Every Day Smoker Vitals History      BMI and BSA Data     Body Mass Index Body Surface Area    22.29 kg/m 2 1.75 m 2         Preferred Pharmacy       Pharmacy Name Phone    [facility] [address] [facility] [place], [address] [place] [address] [phone]         Your Updated Medication List          This list is accurate as of 6/12/18 11:03 AM.  Always use your most recent med list.                albuterol-ipratropium 2.5 mg-0.5 mg/3 ml Nebu   Commonly known as:  DUO-NEB   inhale contents of 1 vial in nebulizer every 6 hours while awake       ALPRAZolam 0.25 mg tablet   Commonly known as:  XANAX       BD ULTRA-FINE II LANCETS 30 gauge Misc   Generic drug:  lancets       Cetirizine 10 mg Cap   Take  by mouth. FEROSUL 325 mg (65 mg iron) tablet   Generic drug:  ferrous sulfate       folic acid 1 mg tablet   Commonly known as:  FOLVITE   Take  by mouth daily. furosemide 40 mg tablet   Commonly known as:  LASIX   Take 40 mg by mouth daily. glucose blood VI test strips strip   Commonly known as:  FREESTYLE LITE STRIPS   Test 4 times daily. Dx code E11.65       ibuprofen 600 mg tablet   Commonly known as:  MOTRIN   800 mg.       insulin glargine 100 unit/mL (3 mL) Inpn   Commonly known as:  SILVIANO FORD   Inject 24 units at bedtime       Insulin Needles (Disposable) 31 gauge x 5/16\" Ndle   Commonly known as:  BD ULTRA-FINE SHORT PEN NEEDLE   Use once daily. Dx code E11.65       Lancing Device Misc   To go with freestyle meter. Test 4 times daily. Dx code E11.65       losartan 25 mg tablet   Commonly known as:  COZAAR       LYRICA 50 mg capsule   Generic drug:  pregabalin   Take 1 Cap by mouth three (3) times daily. meclizine 25 mg tablet   Commonly known as:  ANTIVERT   Take  by mouth three (3) times daily as needed. metFORMIN 500 mg tablet   Commonly known as:  GLUCOPHAGE   Take  by mouth two (2) times daily (with meals).        morphine IR 15 mg tablet   Commonly known as:  MS IR   take 1 tablet by mouth once daily as directed for 30 DAYS       omeprazole 40 mg capsule   Commonly known as:  PRILOSEC   Take 40 mg by mouth daily. PROCTOZONE-HC 2.5 % rectal cream   Generic drug:  hydrocortisone       spironolactone 50 mg tablet   Commonly known as:  ALDACTONE   Take 100 mg by mouth two (2) times a day. tiZANidine 4 mg tablet   Commonly known as:  ZANAFLEX   Take 1 Tab by mouth nightly. VANICREAM topical cream   Generic drug:  emollient               Follow-up Instructions     Return in about 6 months (around 12/12/2018). To-Do List     12/01/2018     Lab:  CBC WITH AUTOMATED DIFF        12/01/2018     Lab:  HEMOGLOBIN A1C WITH EAG        12/01/2018     Lab:  LIPID PANEL        12/01/2018     Lab:  METABOLIC PANEL, COMPREHENSIVE        12/01/2018     Lab:  MICROALBUMIN, UR, RAND W/ MICROALB/CREAT RATIO        12/01/2018     Lab:  TSH 3RD GENERATION          Patient Instructions    --------------------------------------------------------------------------------------------    Refills    -    please call your pharmacy and have them send us a refill request    Results  -  allow up to a week for lab results to be processed and reviewed. Phone calls  -  Allow upto 24 hrs.  for non-urgent calls to be retained    Prior authorization - It may take up to 2 weeks to process, depending on your insurance    Forms  -  FMLA, DMV, patient assistance, etc. will take up to 2 weeks to process    Cancellations - please notify the office in advance if you cannot keep your appointment    Samples  - will only be dispensed at visits as supply is limited      If you are having a medical emergency call 911    --------------------------------------------------------------------------------------------    Do not skip meals  Do not eat in between meals    Reduce carbs- pasta, rice, potatoes, bread   Do not drink juices or sodas  Donot eat peanut butter     Do not eat sugar free cookies and cakes   Do not eat peaches, grapes , pineapples and oranges       Lyrica 3 times a day     Stay on metformin 500 mg twice a day     Check blood sugars immediately before each meal and at bedtime      Take  Lantus  insulin  24 units  at bed time    STOP starlix 60 mg     Less than 70 mg NO INSULIN             Introducing [employer]! Khloe Castorena introduces [employer] patient portal. Now you can access parts of your medical record, email your doctor's office, and request medication refills online. 1. In your internet browser, go to [email or web address]   2. Click on the First Time User? Click Here link in the Sign In box. You will see the New Member Sign Up page. 3. Enter your [employer] Access Code exactly as it appears below. You will not need to use this code after youve completed the sign-up process. If you do not sign up before the expiration date, you must request a new code. · [employer] Access Code: [email or web address]  Expires: 9/10/2018 11:02 AM    4. Enter the last four digits of your Social Security Number (xxxx) and Date of Birth (mm/dd/yyyy) as indicated and click Submit. You will be taken to the next sign-up page. 5. Create a [employer] ID. This will be your [employer] login ID and cannot be changed, so think of one that is secure and easy to remember. 6. Create a [employer] password. You can change your password at any time. 7. Enter your Password Reset Question and Answer. This can be used at a later time if you forget your password. 8. Enter your e-mail address. You will receive e-mail notification when new information is available in [address] 9. Click Sign Up. You can now view and download portions of your medical record. 10. Click the Download Summary menu link to download a portable copy of your medical information. If you have questions, please visit the Frequently Asked Questions section of the [employer] website. Remember, [employer] is NOT to be used for urgent needs.  For medical emergencies, dial 911.      Now available from your iPhone and Android! Please provide this summary of care documentation to your next provider. Your primary care clinician is listed as Brigitte Powell. If you have any questions after today's visit, please call [phone].

## 2018-06-12 NOTE — PATIENT INSTRUCTIONS
--------------------------------------------------------------------------------------------    Refills    -    please call your pharmacy and have them send us a refill request    Results  -  allow up to a week for lab results to be processed and reviewed. Phone calls  -  Allow upto 24 hrs.  for non-urgent calls to be retained    Prior authorization - It may take up to 2 weeks to process, depending on your insurance    Forms  -  FMLA, DMV, patient assistance, etc. will take up to 2 weeks to process    Cancellations - please notify the office in advance if you cannot keep your appointment    Samples  - will only be dispensed at visits as supply is limited      If you are having a medical emergency call 911    --------------------------------------------------------------------------------------------    Do not skip meals  Do not eat in between meals    Reduce carbs- pasta, rice, potatoes, bread   Do not drink juices or sodas  Donot eat peanut butter     Do not eat sugar free cookies and cakes   Do not eat peaches, grapes , pineapples and oranges       Lyrica 3 times a day     Stay on metformin 500 mg twice a day     Check blood sugars immediately before each meal and at bedtime      Take  Lantus  insulin  24 units  at bed time    STOP starlix 60 mg     Less than 70 mg NO INSULIN